# Patient Record
Sex: MALE | Race: WHITE
[De-identification: names, ages, dates, MRNs, and addresses within clinical notes are randomized per-mention and may not be internally consistent; named-entity substitution may affect disease eponyms.]

---

## 2018-10-02 LAB
ANION GAP SERPL CALC-SCNC: 14.9 MMOL/L (ref 8–16)
BASOPHILS # BLD AUTO: 0.1 10*3/UL (ref 0–0.1)
BASOPHILS NFR BLD AUTO: 1 % (ref 0–1)
BUN SERPL-MCNC: 18 MG/DL (ref 7–26)
BUN/CREAT SERPL: 19 (ref 6–25)
CALCIUM SERPL-MCNC: 9.2 MG/DL (ref 8.4–10.2)
CHLORIDE SERPL-SCNC: 104 MMOL/L (ref 98–107)
CO2 SERPL-SCNC: 25 MMOL/L (ref 22–29)
DEPRECATED NEUTROPHILS # BLD AUTO: 3.1 10*3/UL (ref 2.1–6.9)
EGFRCR SERPLBLD CKD-EPI 2021: > 60 ML/MIN (ref 60–?)
EOSINOPHIL # BLD AUTO: 0.1 10*3/UL (ref 0–0.4)
EOSINOPHIL NFR BLD AUTO: 2.3 % (ref 0–6)
ERYTHROCYTE [DISTWIDTH] IN CORD BLOOD: 11.7 % (ref 11.7–14.4)
GLUCOSE SERPLBLD-MCNC: 105 MG/DL (ref 74–118)
HCT VFR BLD AUTO: 35.8 % (ref 38.2–49.6)
HGB BLD-MCNC: 12.5 G/DL (ref 14–18)
LYMPHOCYTES # BLD: 1.3 10*3/UL (ref 1–3.2)
LYMPHOCYTES NFR BLD AUTO: 24.3 % (ref 18–39.1)
MCH RBC QN AUTO: 30.7 PG (ref 28–32)
MCHC RBC AUTO-ENTMCNC: 34.9 G/DL (ref 31–35)
MCV RBC AUTO: 88 FL (ref 81–99)
MONOCYTES # BLD AUTO: 0.6 10*3/UL (ref 0.2–0.8)
MONOCYTES NFR BLD AUTO: 11.8 % (ref 4.4–11.3)
NEUTS SEG NFR BLD AUTO: 60.4 % (ref 38.7–80)
PLATELET # BLD AUTO: 186 X10E3/UL (ref 140–360)
POTASSIUM SERPL-SCNC: 3.9 MMOL/L (ref 3.5–5.1)
RBC # BLD AUTO: 4.07 X10E6/UL (ref 4.3–5.7)
SODIUM SERPL-SCNC: 140 MMOL/L (ref 136–145)

## 2018-10-03 NOTE — DIAGNOSTIC IMAGING REPORT
EXAMINATION: PA and lateral views of the chest.



COMPARISON: None



CLINICAL HISTORY:  Preadmission, prostate surgery

     

DISCUSSION:



Lines/tubes:  None.



Lungs:  The lungs are well inflated and clear. No pneumonia or pulmonary edema.



Pleura:  No pleural effusion or pneumothorax.



Heart and mediastinum:  The cardiomediastinal silhouette is normal.



Bones and soft tissues:  No acute bony abnormalities.  



IMPRESSION: 

No acute cardiopulmonary abnormalities.











Signed by: Dr. Andrew Palisch, M.D. on 10/3/2018 9:34 AM

## 2018-10-04 ENCOUNTER — HOSPITAL ENCOUNTER (OUTPATIENT)
Dept: HOSPITAL 88 - OR | Age: 83
Discharge: HOME | End: 2018-10-04
Attending: UROLOGY
Payer: MEDICARE

## 2018-10-04 VITALS — SYSTOLIC BLOOD PRESSURE: 146 MMHG | DIASTOLIC BLOOD PRESSURE: 86 MMHG

## 2018-10-04 DIAGNOSIS — E78.00: ICD-10-CM

## 2018-10-04 DIAGNOSIS — I10: ICD-10-CM

## 2018-10-04 DIAGNOSIS — Z01.810: ICD-10-CM

## 2018-10-04 DIAGNOSIS — R39.12: ICD-10-CM

## 2018-10-04 DIAGNOSIS — R35.1: ICD-10-CM

## 2018-10-04 DIAGNOSIS — N40.3: ICD-10-CM

## 2018-10-04 DIAGNOSIS — N32.89: ICD-10-CM

## 2018-10-04 DIAGNOSIS — R39.11: ICD-10-CM

## 2018-10-04 DIAGNOSIS — N13.8: ICD-10-CM

## 2018-10-04 DIAGNOSIS — K42.9: ICD-10-CM

## 2018-10-04 DIAGNOSIS — N40.1: Primary | ICD-10-CM

## 2018-10-04 DIAGNOSIS — N43.3: ICD-10-CM

## 2018-10-04 DIAGNOSIS — Z01.812: ICD-10-CM

## 2018-10-04 DIAGNOSIS — E78.5: ICD-10-CM

## 2018-10-04 DIAGNOSIS — R97.20: ICD-10-CM

## 2018-10-04 DIAGNOSIS — E66.01: ICD-10-CM

## 2018-10-04 DIAGNOSIS — Z01.818: ICD-10-CM

## 2018-10-04 DIAGNOSIS — Z86.11: ICD-10-CM

## 2018-10-04 DIAGNOSIS — R00.1: ICD-10-CM

## 2018-10-04 DIAGNOSIS — R35.0: ICD-10-CM

## 2018-10-04 DIAGNOSIS — G50.0: ICD-10-CM

## 2018-10-04 DIAGNOSIS — R39.14: ICD-10-CM

## 2018-10-04 PROCEDURE — 93005 ELECTROCARDIOGRAM TRACING: CPT

## 2018-10-04 PROCEDURE — 71046 X-RAY EXAM CHEST 2 VIEWS: CPT

## 2018-10-04 PROCEDURE — 36415 COLL VENOUS BLD VENIPUNCTURE: CPT

## 2018-10-04 PROCEDURE — 80048 BASIC METABOLIC PNL TOTAL CA: CPT

## 2018-10-04 PROCEDURE — 52005 CYSTO W/URTRL CATHJ: CPT

## 2018-10-04 PROCEDURE — 74420 UROGRAPHY RTRGR +-KUB: CPT

## 2018-10-04 PROCEDURE — 85025 COMPLETE CBC W/AUTO DIFF WBC: CPT

## 2018-10-16 NOTE — XMS REPORT
Patient Summary Document

                             Created on: 10/16/2018



AYSHA BRUCE

External Reference #: [id number]

: 1935

Sex: Male



Demographics







                          Address                   [address] [place] [address]  [id number]

 

                          Home Phone                [phone]

 

                          Preferred Language        Unknown

 

                          Marital Status            Unknown

 

                          Mormon Affiliation     Unknown

 

                          Race                      Unknown

 

                                        Additional Race(s)  

 

                          Ethnic Group              Unknown





Author







                          Author                    [facility]

 

                          Address                   Unknown

 

                          Phone                     Unavailable







Care Team Providers







                    Care Team Member Name    Role                Phone

 

                    MELIA LEAL        Unavailable         Unavailable







Problems

This patient has no known problems.



Allergies, Adverse Reactions, Alerts

This patient has no known allergies or adverse reactions.



Medications

This patient has no known medications.



Results







           Test Description    Test Time    Test Comments    Text Results    Atomic Results    Result

 Comments

 

                CHEST 2 VIEWS    2018-10-03 09:34:00                        [place] [address]      Patient Name: 
AYSHA BRUCE   MR #: [id number]    : 1935 Age/Sex: 83/M  Acct #:
[id number] Req #: [id number]  Adm Physician:     Ordered by: MELIA LEAL MD  
Report #: [id number]   Location: OR  Room/Bed:     
_____________________________________________________________________________
______________________    Procedure: [id number] DX/CHEST 2 VIEWS  Exam Date: 
10/02/18                            Exam Time: 1115       REPORT STATUS: Signed 
     EXAMINATION: PA and lateral views of the chest.      COMPARISON: None      
CLINICAL HISTORY:  Preadmission, prostate surgery           DISCUSSION:      
Lines/tubes:  None.      Lungs:  The lungs are well inflated and clear. No 
pneumonia or pulmonary edema.      Pleura:  No pleural effusion or pneumothorax.
     Heart and mediastinum:  The cardiomediastinal silhouette is normal.      
Bones and soft tissues:  No acute bony abnormalities.        IMPRESSION:    No 
acute cardiopulmonary abnormalities.                  Signed by: Dr. Andrew Palisch, M.D. on 10/3/2018 9:34 AM        Dictated By: ANDREW R PALISCH MD  
Electronically Signed By: ANDREW R PALISCH MD on 10/03/18 0934  Transcribed By: 
MURTAZA on 10/03/18 0934       COPY TO:   MELIA LEAL MD

## 2018-11-13 NOTE — OPERATIVE REPORT
DATE OF PROCEDURE:  October 04, 2018 

 

PREOPERATIVE DIAGNOSES:

1. Obstructive benign prostatic hypertrophy.

2. Incomplete bladder emptying.



POSTOPERATIVE DIAGNOSES:

1. Obstructive benign prostatic hypertrophy.

2. Incomplete bladder emptying.



OPERATIONS PERFORMED:

1. Cystourethroscopy with bilateral ureteral catheterization and retrograde 

ureteropyelography (separate procedure performed for the incomplete bladder 

emptying).

2. Interpretation of retrograde ureteropyelography.

3. Cystourethroscopy with implantation of 4 UroLift implants.



ANESTHESIA:  General.



COMPLICATIONS:  None.



CLINICAL SUMMARY:  Adolfo Vizcaino is an 83-year-old man with the above 

preoperative diagnoses.  He is brought for the above procedures.  The 

patient has had a previous negative prostate biopsy and he is brought for 

hopes of improving his urinary force of stream and lower tract 

symptomatology.  He is aware of the risks of bleeding, infection, injury to 

adjacent structures, need for additional procedures, and elected to 

proceed.



OPERATIVE PROCEDURE IN DETAIL:  Informed consent was verified.  Adolfo Vizcaino was properly identified, taken to the operating room, and placed on 

the cystoscopy table in supine position.  Anesthesia was uneventfully 

begun.  The patient was then carefully and gently repositioned in dorsal 

lithotomy position with all pressure points well padded.  His genitalia 

were prepared and draped in usual sterile fashion.  The 22.5-Divehi 

cystoscope sheath with the visual obturator in place was atraumatically 

inserted into patient's urethra.  It was guided down the unremarkable 

urethra, through the normal sphincteric region, through the prostate bed, 

which was significant for mostly bipolar prostatic hypertrophy with an 

elevated median bar, but no median lobe.  Panendoscopy of the urinary 

bladder revealed mild trabeculations, but no tumors, no stones, and no 

diverticula.  Normally positioned and configured ureteral orifices were 

identified.  An 8-Divehi catheter was used to cannulate each ureter, and 

retrograde ureteral pyelograms were performed.



Interpretation of retrograde ureteropyelography:  Contrast was instilled in 

retrograde fashion bilaterally.  There were no tumors, no stones, and no 

diverticula.  Unobstructed drainage was observed bilaterally 

fluoroscopically.  There appeared to be medial deviation of the ureters 

__________ midportion.



This bladder was drained.  The cystoscope was withdrawn.  A 20-Divehi 

cystoscope sheath with the visual obturator in place was atraumatically 

inserted into the patient's urethra.  It was guided down the unremarkable 

urethra and back into the patient's bladder.  We implanted 4 UroLift 

implants, 2 were placed anterolaterally on either side 1.5 cm distal to the 

bladder neck and 2 were placed on either side anterolaterally at the level 

of the verumontanum.  This resulted in a continuous anterior channel, which 

was unobstructed.  The bladder was drained.  The cystoscope was withdrawn.  

Digital rectal examination revealed a 40 g prostate, which was indurated at 

the right mid.  The patient was then uneventfully reversed from anesthesia 

and taken to recovery room in stable condition.  Explicit post instructions 

were given.  Will follow the patient up in the office, at which point in 

time will perform uroflowmetry and bladder ultrasonography.







DD:  11/12/2018 22:36

DT:  11/13/2018 01:32

Job#:  [id number] 





cc:CORNELIA SALAZAR MD

## 2023-03-30 LAB
ALBUMIN SERPL-MCNC: 3.7 G/DL (ref 3.5–5)
ALBUMIN/GLOB SERPL: 1.2 {RATIO} (ref 0.8–2)
ALP SERPL-CCNC: 68 IU/L (ref 40–150)
ALT SERPL-CCNC: 15 IU/L (ref 0–55)
ANION GAP SERPL CALC-SCNC: 16.8 MMOL/L (ref 8–16)
BASOPHILS # BLD AUTO: 0.1 10*3/UL (ref 0–0.1)
BASOPHILS NFR BLD AUTO: 0.9 % (ref 0–1)
BUN SERPL-MCNC: 21 MG/DL (ref 7–26)
BUN/CREAT SERPL: 24 (ref 6–25)
CALCIUM SERPL-MCNC: 9 MG/DL (ref 8.4–10.2)
CHLORIDE SERPL-SCNC: 104 MMOL/L (ref 98–107)
CO2 SERPL-SCNC: 23 MMOL/L (ref 22–29)
DEPRECATED APTT PLAS QN: 28.1 SECONDS (ref 23.8–35.5)
DEPRECATED INR PLAS: 0.91
DEPRECATED NEUTROPHILS # BLD AUTO: 4.9 10*3/UL (ref 2.1–6.9)
EOSINOPHIL # BLD AUTO: 0.2 10*3/UL (ref 0–0.4)
EOSINOPHIL NFR BLD AUTO: 2.2 % (ref 0–6)
ERYTHROCYTE [DISTWIDTH] IN CORD BLOOD: 12.7 % (ref 11.7–14.4)
GLOBULIN PLAS-MCNC: 3.2 G/DL (ref 2.3–3.5)
GLUCOSE SERPLBLD-MCNC: 106 MG/DL (ref 74–118)
HCT VFR BLD AUTO: 37.2 % (ref 38.2–49.6)
HGB BLD-MCNC: 12 G/DL (ref 14–18)
LYMPHOCYTES # BLD: 1.2 10*3/UL (ref 1–3.2)
LYMPHOCYTES NFR BLD AUTO: 18 % (ref 18–39.1)
MCH RBC QN AUTO: 29.6 PG (ref 28–32)
MCHC RBC AUTO-ENTMCNC: 32.3 G/DL (ref 31–35)
MCV RBC AUTO: 91.9 FL (ref 81–99)
MONOCYTES # BLD AUTO: 0.6 10*3/UL (ref 0.2–0.8)
MONOCYTES NFR BLD AUTO: 8 % (ref 4.4–11.3)
NEUTS SEG NFR BLD AUTO: 70.6 % (ref 38.7–80)
PLATELET # BLD AUTO: 264 X10E3/UL (ref 140–360)
POTASSIUM SERPL-SCNC: 3.8 MMOL/L (ref 3.5–5.1)
PROTHROMBIN TIME: 12.8 SECONDS (ref 11.9–14.5)
RBC # BLD AUTO: 4.05 X10E6/UL (ref 4.3–5.7)
SODIUM SERPL-SCNC: 140 MMOL/L (ref 136–145)

## 2023-04-06 ENCOUNTER — HOSPITAL ENCOUNTER (OUTPATIENT)
Dept: HOSPITAL 88 - OR | Age: 88
Setting detail: OBSERVATION
LOS: 1 days | Discharge: HOME | End: 2023-04-07
Attending: UROLOGY | Admitting: UROLOGY
Payer: COMMERCIAL

## 2023-04-06 VITALS — DIASTOLIC BLOOD PRESSURE: 60 MMHG | SYSTOLIC BLOOD PRESSURE: 117 MMHG

## 2023-04-06 VITALS — SYSTOLIC BLOOD PRESSURE: 148 MMHG | DIASTOLIC BLOOD PRESSURE: 68 MMHG

## 2023-04-06 VITALS — SYSTOLIC BLOOD PRESSURE: 148 MMHG | DIASTOLIC BLOOD PRESSURE: 60 MMHG

## 2023-04-06 VITALS — SYSTOLIC BLOOD PRESSURE: 154 MMHG | DIASTOLIC BLOOD PRESSURE: 70 MMHG

## 2023-04-06 VITALS — DIASTOLIC BLOOD PRESSURE: 68 MMHG | SYSTOLIC BLOOD PRESSURE: 148 MMHG

## 2023-04-06 VITALS — HEIGHT: 69 IN | WEIGHT: 200 LBS | BODY MASS INDEX: 29.62 KG/M2

## 2023-04-06 DIAGNOSIS — N39.43: ICD-10-CM

## 2023-04-06 DIAGNOSIS — Z01.810: ICD-10-CM

## 2023-04-06 DIAGNOSIS — Q82.0: ICD-10-CM

## 2023-04-06 DIAGNOSIS — N40.0: Primary | ICD-10-CM

## 2023-04-06 DIAGNOSIS — Z79.899: ICD-10-CM

## 2023-04-06 DIAGNOSIS — R39.12: ICD-10-CM

## 2023-04-06 DIAGNOSIS — A49.8: ICD-10-CM

## 2023-04-06 DIAGNOSIS — H35.30: ICD-10-CM

## 2023-04-06 DIAGNOSIS — H91.8X1: ICD-10-CM

## 2023-04-06 DIAGNOSIS — I10: ICD-10-CM

## 2023-04-06 DIAGNOSIS — E78.2: ICD-10-CM

## 2023-04-06 DIAGNOSIS — I20.8: ICD-10-CM

## 2023-04-06 DIAGNOSIS — Z46.6: ICD-10-CM

## 2023-04-06 DIAGNOSIS — Z01.818: ICD-10-CM

## 2023-04-06 DIAGNOSIS — E66.9: ICD-10-CM

## 2023-04-06 DIAGNOSIS — R31.0: ICD-10-CM

## 2023-04-06 DIAGNOSIS — Z20.822: ICD-10-CM

## 2023-04-06 DIAGNOSIS — Z87.440: ICD-10-CM

## 2023-04-06 DIAGNOSIS — R35.1: ICD-10-CM

## 2023-04-06 DIAGNOSIS — Z01.812: ICD-10-CM

## 2023-04-06 DIAGNOSIS — R39.14: ICD-10-CM

## 2023-04-06 DIAGNOSIS — N32.89: ICD-10-CM

## 2023-04-06 DIAGNOSIS — N42.0: ICD-10-CM

## 2023-04-06 PROCEDURE — 52005 CYSTO W/URTRL CATHJ: CPT

## 2023-04-06 PROCEDURE — 88305 TISSUE EXAM BY PATHOLOGIST: CPT

## 2023-04-06 PROCEDURE — 0223U NFCT DS 22 TRGT SARS-COV-2: CPT

## 2023-04-06 PROCEDURE — 85730 THROMBOPLASTIN TIME PARTIAL: CPT

## 2023-04-06 PROCEDURE — 93005 ELECTROCARDIOGRAM TRACING: CPT

## 2023-04-06 PROCEDURE — 80053 COMPREHEN METABOLIC PANEL: CPT

## 2023-04-06 PROCEDURE — 52601 PROSTATECTOMY (TURP): CPT

## 2023-04-06 PROCEDURE — 88304 TISSUE EXAM BY PATHOLOGIST: CPT

## 2023-04-06 PROCEDURE — 85025 COMPLETE CBC W/AUTO DIFF WBC: CPT

## 2023-04-06 PROCEDURE — 36415 COLL VENOUS BLD VENIPUNCTURE: CPT

## 2023-04-06 PROCEDURE — 87186 SC STD MICRODIL/AGAR DIL: CPT

## 2023-04-06 PROCEDURE — 87086 URINE CULTURE/COLONY COUNT: CPT

## 2023-04-06 PROCEDURE — 71046 X-RAY EXAM CHEST 2 VIEWS: CPT

## 2023-04-06 PROCEDURE — 74420 UROGRAPHY RTRGR +-KUB: CPT

## 2023-04-06 PROCEDURE — 85610 PROTHROMBIN TIME: CPT

## 2023-04-06 RX ADMIN — Medication SCH MG: at 18:35

## 2023-04-07 VITALS — SYSTOLIC BLOOD PRESSURE: 165 MMHG | DIASTOLIC BLOOD PRESSURE: 64 MMHG

## 2023-04-07 VITALS — SYSTOLIC BLOOD PRESSURE: 144 MMHG | DIASTOLIC BLOOD PRESSURE: 59 MMHG

## 2023-04-07 VITALS — SYSTOLIC BLOOD PRESSURE: 142 MMHG | DIASTOLIC BLOOD PRESSURE: 97 MMHG

## 2023-04-07 RX ADMIN — Medication SCH MG: at 08:43

## 2023-05-24 ENCOUNTER — HOSPITAL ENCOUNTER (EMERGENCY)
Dept: HOSPITAL 88 - FSED | Age: 88
Discharge: HOME | End: 2023-05-24
Payer: MEDICARE

## 2023-05-24 VITALS — WEIGHT: 200 LBS | BODY MASS INDEX: 29.62 KG/M2 | HEIGHT: 69 IN

## 2023-05-24 VITALS — OXYGEN SATURATION: 97 %

## 2023-05-24 DIAGNOSIS — N40.1: ICD-10-CM

## 2023-05-24 DIAGNOSIS — N45.3: ICD-10-CM

## 2023-05-24 DIAGNOSIS — N39.0: ICD-10-CM

## 2023-05-24 DIAGNOSIS — R39.11: Primary | ICD-10-CM

## 2023-05-24 DIAGNOSIS — I10: ICD-10-CM

## 2023-05-24 DIAGNOSIS — E78.5: ICD-10-CM

## 2023-05-24 PROCEDURE — 99282 EMERGENCY DEPT VISIT SF MDM: CPT

## 2023-05-28 ENCOUNTER — HOSPITAL ENCOUNTER (EMERGENCY)
Dept: HOSPITAL 88 - FSED | Age: 88
Discharge: HOME | End: 2023-05-28
Payer: MEDICARE

## 2023-05-28 VITALS — OXYGEN SATURATION: 98 %

## 2023-05-28 VITALS — HEIGHT: 69 IN | WEIGHT: 194.44 LBS | BODY MASS INDEX: 28.8 KG/M2

## 2023-05-28 DIAGNOSIS — R30.0: ICD-10-CM

## 2023-05-28 DIAGNOSIS — H35.30: ICD-10-CM

## 2023-05-28 DIAGNOSIS — I10: ICD-10-CM

## 2023-05-28 DIAGNOSIS — R33.9: Primary | ICD-10-CM

## 2023-05-28 DIAGNOSIS — E78.5: ICD-10-CM

## 2023-05-28 PROCEDURE — 99283 EMERGENCY DEPT VISIT LOW MDM: CPT

## 2023-05-28 PROCEDURE — 81003 URINALYSIS AUTO W/O SCOPE: CPT

## 2023-05-28 PROCEDURE — 51700 IRRIGATION OF BLADDER: CPT
